# Patient Record
Sex: FEMALE | Race: BLACK OR AFRICAN AMERICAN
[De-identification: names, ages, dates, MRNs, and addresses within clinical notes are randomized per-mention and may not be internally consistent; named-entity substitution may affect disease eponyms.]

---

## 2017-05-10 ENCOUNTER — HOSPITAL ENCOUNTER (EMERGENCY)
Dept: HOSPITAL 63 - ER | Age: 62
LOS: 1 days | Discharge: HOME | End: 2017-05-11
Payer: COMMERCIAL

## 2017-05-10 VITALS
DIASTOLIC BLOOD PRESSURE: 83 MMHG | SYSTOLIC BLOOD PRESSURE: 127 MMHG | DIASTOLIC BLOOD PRESSURE: 83 MMHG | DIASTOLIC BLOOD PRESSURE: 83 MMHG | SYSTOLIC BLOOD PRESSURE: 127 MMHG | SYSTOLIC BLOOD PRESSURE: 127 MMHG | SYSTOLIC BLOOD PRESSURE: 127 MMHG | DIASTOLIC BLOOD PRESSURE: 83 MMHG | SYSTOLIC BLOOD PRESSURE: 127 MMHG | DIASTOLIC BLOOD PRESSURE: 83 MMHG

## 2017-05-10 DIAGNOSIS — L85.3: Primary | ICD-10-CM

## 2017-05-10 PROCEDURE — 99281 EMR DPT VST MAYX REQ PHY/QHP: CPT

## 2017-05-10 NOTE — ED.ADGEN
Adult General


Chief Complaint


Chief Complaint


Sore on hands





\[facility] [place] [facility]\""


HPI





Patient is a 61 year old and American female who presents with is on the dorsum 

of bilateral hands. She states his been going on [employer] several weeks she has 

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 She states the skin on the 

dorsum of her right hand is been getting thicker over the last several weeks 

and is started to get dry and chapped now. She denies any fevers chills nausea 

or vomiting. She states now that same thing started happen on the left hand.





Review of Systems


Review of Systems





Constitutional: Denies fever or chills []


Eyes: Denies change in visual acuity, redness, or eye pain []


HENT: Denies nasal congestion or sore throat []


Respiratory: Denies cough or shortness of breath []


Cardiovascular: No additional information not addressed in HPI []


GI: Denies abdominal pain, nausea, vomiting, bloody stools or diarrhea []


: Denies dysuria or hematuria []


Musculoskeletal: Denies back pain or joint pain []


Integument: Positive for skin lesions on bilateral hands


Neurologic: Denies headache, focal weakness or sensory changes []


Endocrine: Denies polyuria or polydipsia []





Physical Exam


Physical Exam





Constitutional: Well developed, well nourished, no acute distress, non-toxic 

appearance. []


HENT: Normocephalic, atraumatic, bilateral external ears normal, oropharynx 

moist, no oral exudates, nose normal. []


Eyes: PERRLA, EOMI, conjunctiva normal, no discharge. [] 


Neck: Normal range of motion, no tenderness, supple, no stridor. [] 


Cardiovascular:Heart rate regular rhythm, no murmur []


Lungs & Thorax:  Bilateral breath sounds clear to auscultation []


Abdomen: Bowel sounds normal, soft, no tenderness, no masses, no pulsatile 

masses. [] 


Skin: Thick skin lesions over the dorsum of right hand that's approximately 7 x 

6 cm with multiple cracks. 1 x 2 cm thick skin lesions over the dorsum of the 

left hand.


Back: No tenderness, no CVA tenderness. [] 


Extremities: No tenderness, no cyanosis, no clubbing, ROM intact, no edema. [] 


Neurologic: Alert and oriented X 3, normal motor function, normal sensory 

function, no focal deficits noted. []


Psychologic: Affect normal, judgement normal, mood normal. []





EKG


EKG


[]





Radiology/Procedures


Radiology/Procedures


[]





Course & Med Decision Making


Course & Med Decision Making


Pertinent Labs and Imaging studies reviewed. (See chart for details)





She will need to use Eucerin cream on her hands after she completes partially 

five-day course of triple antibiotic ointment apply to hands 3 times daily. She 

can wear mittens at night to help protect her hands and she is instructed to 

keep her hands dry while at work. Return precautions given she is agreeable to 

the plan and being discharged in stable condition this time. Tetanus was 

updated.





Final Impression


Final Impression


Dry Skin of bilateral hands


Problems:  





Dragon Disclaimer


Dragon Disclaimer


This electronic medical record was generated, in whole or in part, using a 

voice recognition dictation system.











RO KILGORE MD May 10, 2017 22:34

## 2018-03-21 ENCOUNTER — HOSPITAL ENCOUNTER (OUTPATIENT)
Dept: HOSPITAL 63 - MAMMO | Age: 63
Discharge: HOME | End: 2018-03-21
Payer: COMMERCIAL

## 2018-03-21 DIAGNOSIS — Z12.31: Primary | ICD-10-CM

## 2018-03-21 PROCEDURE — 77067 SCR MAMMO BI INCL CAD: CPT

## 2018-03-21 NOTE — RAD
DATE: 3/21/2018



EXAM: DIGITAL SCREEN BILAT W/CAD



HISTORY: Routine screening



COMPARISON: 9/15/2015



This study was interpreted with the benefit of Computerized Aided Detection

(CAD).



The breast parenchyma shows scattered fibroglandular densities. Breast

parenchyma level B.



FINDINGS: No new or enlarging breast densities are seen.  Minimal benign type

calcification is present.  No suspicious microcalcifications have developed.  



IMPRESSION: Stable mammograms without evidence of malignancy.





BI-RADS CATEGORY: 2 BENIGN FINDING(S)



RECOMMENDED FOLLOW-UP: 12M 12 MONTH FOLLOW-UP



PQRS compliance statement: Patient information was entered into a reminder

system with a target due date     for the next mammogram.



Mammography is a sensitive method for finding small breast cancers, but it

does not detect them all and is not a substitute for careful clinical

examination.  A negative mammogram does not negate a clinically suspicious

finding and should not result in delay in biopsying a clinically suspicious

abnormality.



"Our facility is accredited by the [employer] Mammography

Program."

## 2019-04-22 ENCOUNTER — HOSPITAL ENCOUNTER (OUTPATIENT)
Dept: HOSPITAL 63 - MAMMO | Age: 64
Discharge: HOME | End: 2019-04-22
Payer: COMMERCIAL

## 2019-04-22 DIAGNOSIS — Z12.31: Primary | ICD-10-CM

## 2019-04-22 PROCEDURE — 77063 BREAST TOMOSYNTHESIS BI: CPT

## 2019-04-22 PROCEDURE — 77067 SCR MAMMO BI INCL CAD: CPT

## 2019-04-23 NOTE — RAD
DATE: 4/22/2019



EXAM: MAMMO UCHE SCREENING BILATERAL



HISTORY: Routine screening



COMPARISON: May 15, 2015 and 3/21/2018 mammographic exams



This study was interpreted with the benefit of Computerized Aided Detection

(CAD).





Breast Density: SCATTERED The breast parenchyma shows scattered fibroglandular

densities. Breast parenchyma level B.





FINDINGS: Parenchymal distribution is stable.  No new masses or distortion.  





IMPRESSION: Stable







BI-RADS CATEGORY: 1 NEGATIVE



RECOMMENDED FOLLOW-UP: 12M 12 MONTH FOLLOW-UP



PQRS compliance statement: Patient information was entered into a reminder

system with a target due date in one year for the next mammogram.



Mammography is a sensitive method for finding small breast cancers, but it

does not detect them all and is not a substitute for careful clinical

examination.  A negative mammogram does not negate a clinically suspicious

finding and should not result in delay in biopsying a clinically suspicious

abnormality.



"Our facility is accredited by the [employer] Mammography

Program."

## 2020-04-23 ENCOUNTER — HOSPITAL ENCOUNTER (OUTPATIENT)
Dept: HOSPITAL 63 - MAMMO | Age: 65
End: 2020-04-23
Payer: COMMERCIAL

## 2020-04-23 DIAGNOSIS — Z12.31: Primary | ICD-10-CM

## 2020-04-23 PROCEDURE — 77067 SCR MAMMO BI INCL CAD: CPT

## 2022-01-26 ENCOUNTER — HOSPITAL ENCOUNTER (OUTPATIENT)
Dept: HOSPITAL 63 - RAD | Age: 67
End: 2022-01-26
Payer: COMMERCIAL

## 2022-01-26 DIAGNOSIS — M76.892: ICD-10-CM

## 2022-01-26 DIAGNOSIS — M17.0: Primary | ICD-10-CM

## 2022-01-26 DIAGNOSIS — M76.891: ICD-10-CM

## 2022-01-26 DIAGNOSIS — M21.162: ICD-10-CM

## 2022-01-26 DIAGNOSIS — M21.161: ICD-10-CM

## 2022-01-26 PROCEDURE — 73565 X-RAY EXAM OF KNEES: CPT

## 2022-01-26 NOTE — RAD
EXAM: Bilateral knees, 3 views.



HISTORY: Pain.



COMPARISON: None.



FINDINGS: 



Right knee: 3 views of the right knee are obtained. There is severe medial compartment joint space na
rrowing, subchondral scoliosis and spurring. There is mild patellofemoral compartment spurring. There
 is a small joint effusion. There is enthesopathy along the patella. There is internal fixation of a 
healed femoral fracture, partially included on the field-of-view. There is genu varus.



Left knee: 3 views of the left knee are obtained. There is severe medial compartment joint space narr
owing, subchondral sclerosis and spurring. There is mild lateral and patellofemoral compartment spurr
ing. There is trace joint fluid. There are joint loose bodies. There is enthesopathy along the patell
a. There is mild genu varus.



IMPRESSION: 

1. Severe medial compartment predominant osteoarthritis of both knees with bilateral genu varus.

2. Small left knee joint loose bodies.

3. Internal fixation of a healed right femoral fracture.



Electronically signed by: Lorenza Arzate MD (1/26/2022 3:18 PM) [id number]

## 2022-01-26 NOTE — RAD
EXAM: Bilateral knees, 3 views.



HISTORY: Pain.



COMPARISON: None.



FINDINGS: 



Right knee: 3 views of the right knee are obtained. There is severe medial compartment joint space na
rrowing, subchondral scoliosis and spurring. There is mild patellofemoral compartment spurring. There
 is a small joint effusion. There is enthesopathy along the patella. There is internal fixation of a 
healed femoral fracture, partially included on the field-of-view. There is genu varus.



Left knee: 3 views of the left knee are obtained. There is severe medial compartment joint space narr
owing, subchondral sclerosis and spurring. There is mild lateral and patellofemoral compartment spurr
ing. There is trace joint fluid. There are joint loose bodies. There is enthesopathy along the patell
a. There is mild genu varus.



IMPRESSION: 

1. Severe medial compartment predominant osteoarthritis of both knees with bilateral genu varus.

2. Small left knee joint loose bodies.

3. Internal fixation of a healed right femoral fracture.



Electronically signed by: Lorenza Arzate MD (1/26/2022 3:18 PM) [id number]